# Patient Record
Sex: MALE | Race: WHITE | NOT HISPANIC OR LATINO | Employment: FULL TIME | URBAN - METROPOLITAN AREA
[De-identification: names, ages, dates, MRNs, and addresses within clinical notes are randomized per-mention and may not be internally consistent; named-entity substitution may affect disease eponyms.]

---

## 2024-05-04 ENCOUNTER — OFFICE VISIT (OUTPATIENT)
Dept: URGENT CARE | Facility: CLINIC | Age: 59
End: 2024-05-04
Payer: COMMERCIAL

## 2024-05-04 VITALS
RESPIRATION RATE: 19 BRPM | WEIGHT: 175 LBS | TEMPERATURE: 98 F | DIASTOLIC BLOOD PRESSURE: 81 MMHG | SYSTOLIC BLOOD PRESSURE: 127 MMHG | HEIGHT: 71 IN | HEART RATE: 95 BPM | BODY MASS INDEX: 24.5 KG/M2 | OXYGEN SATURATION: 99 %

## 2024-05-04 DIAGNOSIS — T78.40XA ALLERGIC REACTION, INITIAL ENCOUNTER: Primary | ICD-10-CM

## 2024-05-04 PROCEDURE — 96372 THER/PROPH/DIAG INJ SC/IM: CPT | Mod: S$GLB,,, | Performed by: FAMILY MEDICINE

## 2024-05-04 PROCEDURE — 99213 OFFICE O/P EST LOW 20 MIN: CPT | Mod: 25,S$GLB,, | Performed by: FAMILY MEDICINE

## 2024-05-04 RX ORDER — BETAMETHASONE SODIUM PHOSPHATE AND BETAMETHASONE ACETATE 3; 3 MG/ML; MG/ML
6 INJECTION, SUSPENSION INTRA-ARTICULAR; INTRALESIONAL; INTRAMUSCULAR; SOFT TISSUE
Status: COMPLETED | OUTPATIENT
Start: 2024-05-04 | End: 2024-05-04

## 2024-05-04 RX ADMIN — BETAMETHASONE SODIUM PHOSPHATE AND BETAMETHASONE ACETATE 6 MG: 3; 3 INJECTION, SUSPENSION INTRA-ARTICULAR; INTRALESIONAL; INTRAMUSCULAR; SOFT TISSUE at 09:05

## 2024-05-04 NOTE — PROGRESS NOTES
"Subjective:      Patient ID: Gonzalo Guerra is a 58 y.o. male.    Vitals:  height is 5' 11" (1.803 m) and weight is 79.4 kg (175 lb). His temperature is 98.2 °F (36.8 °C). His blood pressure is 127/81 and his pulse is 95. His respiration is 19 and oxygen saturation is 99%.     Chief Complaint: Rash    Patient  has 5 days of a rash, the rash originally started on feet but has now traveled to all over body. He has tried OTC topical cream for it but rash continues to spread. He has not tried any new foods or soaps.    Rash  This is a new problem. The current episode started in the past 7 days. The affected locations include the left upper leg, right arm, right elbow, right lower leg, right upper leg, right toes, right foot, right ankle, right hand, left lower leg and left ankle. The rash is characterized by redness and itchiness. Pertinent negatives include no anorexia, congestion, cough, diarrhea, fever or joint pain. Past treatments include antibiotic cream, anti-itch cream and antihistamine.       Constitution: Negative for fever.   HENT:  Negative for congestion.    Respiratory:  Negative for cough.    Gastrointestinal:  Negative for diarrhea.   Skin:  Positive for rash and erythema.      Objective:     Physical Exam   Constitutional: He does not appear ill. No distress. normal  HENT:   Mouth/Throat: No posterior oropharyngeal erythema.   Cardiovascular: Normal rate, regular rhythm, normal heart sounds and normal pulses.   Pulmonary/Chest: Effort normal and breath sounds normal.   Abdominal: Normal appearance.   Neurological: He is alert.   Skin: Skin is rash. erythema and lesion (fine papular lesions enveloping body surface extending from ankles to upper chest and arms.)   Nursing note and vitals reviewed.      Assessment:     1. Allergic reaction, initial encounter        Plan:       Allergic reaction, initial encounter  -     betamethasone acetate-betamethasone sodium phosphate injection 6 mg    Recommended " claritin and benadryl OTC. To stop serum. Followup with allergist

## 2024-05-06 ENCOUNTER — TELEPHONE (OUTPATIENT)
Dept: DERMATOLOGY | Facility: CLINIC | Age: 59
End: 2024-05-06
Payer: COMMERCIAL

## 2024-05-06 NOTE — TELEPHONE ENCOUNTER
LVM informing pt to call office back to get scheduled for rash.     ----- Message from Aaron Perez sent at 5/3/2024  3:49 PM CDT -----  Regarding: appt needed  Contact: pt @  [phone]  Pt is needing appt for bilateral rash from feet to legs as soon as possible. Please call to advise further. Thank you for all you are doing.